# Patient Record
Sex: FEMALE | Race: WHITE
[De-identification: names, ages, dates, MRNs, and addresses within clinical notes are randomized per-mention and may not be internally consistent; named-entity substitution may affect disease eponyms.]

---

## 2020-11-10 ENCOUNTER — HOSPITAL ENCOUNTER (OUTPATIENT)
Dept: HOSPITAL 62 - WI | Age: 50
End: 2020-11-10
Attending: NURSE PRACTITIONER
Payer: COMMERCIAL

## 2020-11-10 DIAGNOSIS — Z12.31: Primary | ICD-10-CM

## 2020-11-10 PROCEDURE — 77067 SCR MAMMO BI INCL CAD: CPT

## 2020-11-10 NOTE — WOMENS IMAGING REPORT
EXAM DESCRIPTION:  PINK WARRIOR BILATERAL SCREEN



IMAGES COMPLETED DATE/TIME:  11/10/2020 1:07 pm



[facility] STUDY[facility]  [facility] MALIGNANT NEOPLASM OF BREAST Z12.31 
 ENCNTR SCREEN MAMMOGRAM FOR MALIGNANT NEOPLASM OF NICK



COMPARISON:  None.



EXAM PARAMETERS:  Standard craniocaudal and mediolateral oblique views of each breast recorded using 
digital acquisition.

Read with the assistance of CAD.

.[facility] - R2  Version 9.2



LIMITATIONS:  None.



FINDINGS:  No suspicious masses, suspicious calcifications or architectural distortion. No areas of c
oncern.



IMPRESSION:  NEGATIVE MAMMOGRAM.  BIRADS 1



BREAST DENSITY:  b. There are scattered areas of fibroglandular density.



BIRAD:  ASSESSMENT:  1 NEGATIVE



RECOMMENDATION:  ROUTINE SCREENING



COMMENT:  The patient has been notified of the results by letter per MQSA requirements. Additional no
tification policies are in place for contacting patient with suspicious or incomplete findings.

Quality ID #225: The [employer] recommends an annual screening mammogram for women
 aged 40 years or over. This facility utilizes a reminder system to ensure that all patients receive 
reminder letters, and/or direct phone calls for appointments. This includes reminders for routine scr
eening mammograms, diagnostic mammograms, or other Breast Imaging Interventions when appropriate.  Th
is patient will be placed in the appropriate reminder system.



TECHNICAL DOCUMENTATION:  FINDING NUMBER: (1)

ASSESSMENT: (1)

JOB ID:  [id number]

 2011 [employer]- All Rights Reserved



Reading location - IP/workstation name: [email or web address]